# Patient Record
Sex: FEMALE | Race: OTHER | NOT HISPANIC OR LATINO
[De-identification: names, ages, dates, MRNs, and addresses within clinical notes are randomized per-mention and may not be internally consistent; named-entity substitution may affect disease eponyms.]

---

## 2022-07-20 PROBLEM — Z00.00 ENCOUNTER FOR PREVENTIVE HEALTH EXAMINATION: Status: ACTIVE | Noted: 2022-07-20

## 2022-07-27 ENCOUNTER — LABORATORY RESULT (OUTPATIENT)
Age: 32
End: 2022-07-27

## 2022-07-27 ENCOUNTER — TRANSCRIPTION ENCOUNTER (OUTPATIENT)
Age: 32
End: 2022-07-27

## 2022-07-27 ENCOUNTER — APPOINTMENT (OUTPATIENT)
Dept: OBGYN | Facility: CLINIC | Age: 32
End: 2022-07-27

## 2022-07-27 VITALS
DIASTOLIC BLOOD PRESSURE: 73 MMHG | BODY MASS INDEX: 20.16 KG/M2 | WEIGHT: 100 LBS | OXYGEN SATURATION: 100 % | SYSTOLIC BLOOD PRESSURE: 110 MMHG | HEIGHT: 59 IN | HEART RATE: 97 BPM

## 2022-07-27 PROCEDURE — 99213 OFFICE O/P EST LOW 20 MIN: CPT

## 2022-08-11 ENCOUNTER — LABORATORY RESULT (OUTPATIENT)
Age: 32
End: 2022-08-11

## 2022-08-12 ENCOUNTER — NON-APPOINTMENT (OUTPATIENT)
Age: 32
End: 2022-08-12

## 2022-08-17 ENCOUNTER — APPOINTMENT (OUTPATIENT)
Dept: OBGYN | Facility: CLINIC | Age: 32
End: 2022-08-17

## 2022-08-17 VITALS
SYSTOLIC BLOOD PRESSURE: 112 MMHG | OXYGEN SATURATION: 98 % | WEIGHT: 105 LBS | BODY MASS INDEX: 21.21 KG/M2 | HEART RATE: 98 BPM | DIASTOLIC BLOOD PRESSURE: 69 MMHG

## 2022-08-17 PROCEDURE — 99213 OFFICE O/P EST LOW 20 MIN: CPT | Mod: TH

## 2022-08-17 RX ORDER — DOCUSATE SODIUM 100 MG/1
100 CAPSULE ORAL TWICE DAILY
Qty: 60 | Refills: 2 | Status: ACTIVE | COMMUNITY
Start: 2022-08-17 | End: 1900-01-01

## 2022-09-08 ENCOUNTER — APPOINTMENT (OUTPATIENT)
Dept: ANTEPARTUM | Facility: CLINIC | Age: 32
End: 2022-09-08

## 2022-09-08 ENCOUNTER — ASOB RESULT (OUTPATIENT)
Age: 32
End: 2022-09-08

## 2022-09-08 DIAGNOSIS — Z63.8 OTHER SPECIFIED PROBLEMS RELATED TO PRIMARY SUPPORT GROUP: ICD-10-CM

## 2022-09-08 PROCEDURE — 76811 OB US DETAILED SNGL FETUS: CPT

## 2022-09-08 SDOH — SOCIAL STABILITY - SOCIAL INSECURITY: OTHER SPECIFIED PROBLEMS RELATED TO PRIMARY SUPPORT GROUP: Z63.8

## 2022-09-13 ENCOUNTER — NON-APPOINTMENT (OUTPATIENT)
Age: 32
End: 2022-09-13

## 2022-09-14 ENCOUNTER — APPOINTMENT (OUTPATIENT)
Dept: OBGYN | Facility: CLINIC | Age: 32
End: 2022-09-14

## 2022-09-14 VITALS — BODY MASS INDEX: 22.42 KG/M2 | WEIGHT: 111 LBS | SYSTOLIC BLOOD PRESSURE: 112 MMHG | DIASTOLIC BLOOD PRESSURE: 76 MMHG

## 2022-09-14 PROCEDURE — 99213 OFFICE O/P EST LOW 20 MIN: CPT | Mod: TH

## 2022-09-27 LAB
CLARI TEST COMMENT: NORMAL
CLARIM 22Q11.2: NORMAL
CLARIM ADDITIONAL INFO: NORMAL
CLARIM CHROMOSOME 13: NORMAL
CLARIM CHROMOSOME 18: NORMAL
CLARIM CHROMOSOME 21: NORMAL
CLARIM EDD: NORMAL
CLARIM SEX CHROMOSOMES: NORMAL
CLARITEST NIPT W/MICRO: NORMAL
FETAL FRACT: NORMAL
GESTATION AGE: NORMAL
MATERNAL WEIGHT (LBS):: 109
PLEASE INCLUDE GENDER RESULTS ON THIS REPORT:: NORMAL
TYPE OF PREGNANCY:: NORMAL

## 2022-10-04 ENCOUNTER — NON-APPOINTMENT (OUTPATIENT)
Age: 32
End: 2022-10-04

## 2022-10-05 ENCOUNTER — APPOINTMENT (OUTPATIENT)
Dept: OBGYN | Facility: CLINIC | Age: 32
End: 2022-10-05

## 2022-10-05 VITALS
DIASTOLIC BLOOD PRESSURE: 80 MMHG | HEART RATE: 121 BPM | BODY MASS INDEX: 23.03 KG/M2 | OXYGEN SATURATION: 99 % | WEIGHT: 114 LBS | SYSTOLIC BLOOD PRESSURE: 120 MMHG

## 2022-10-05 PROCEDURE — 99213 OFFICE O/P EST LOW 20 MIN: CPT | Mod: TH

## 2022-10-06 ENCOUNTER — APPOINTMENT (OUTPATIENT)
Dept: ANTEPARTUM | Facility: CLINIC | Age: 32
End: 2022-10-06

## 2022-10-06 ENCOUNTER — ASOB RESULT (OUTPATIENT)
Age: 32
End: 2022-10-06

## 2022-10-06 PROCEDURE — 76816 OB US FOLLOW-UP PER FETUS: CPT

## 2022-10-20 ENCOUNTER — LABORATORY RESULT (OUTPATIENT)
Age: 32
End: 2022-10-20

## 2022-10-25 ENCOUNTER — NON-APPOINTMENT (OUTPATIENT)
Age: 32
End: 2022-10-25

## 2022-10-27 ENCOUNTER — APPOINTMENT (OUTPATIENT)
Dept: OBGYN | Facility: CLINIC | Age: 32
End: 2022-10-27

## 2022-10-27 VITALS
BODY MASS INDEX: 24.64 KG/M2 | WEIGHT: 122 LBS | HEART RATE: 106 BPM | OXYGEN SATURATION: 98 % | SYSTOLIC BLOOD PRESSURE: 115 MMHG | DIASTOLIC BLOOD PRESSURE: 72 MMHG

## 2022-10-27 DIAGNOSIS — O99.810 ABNORMAL GLUCOSE COMPLICATING PREGNANCY: ICD-10-CM

## 2022-10-27 PROCEDURE — 99213 OFFICE O/P EST LOW 20 MIN: CPT | Mod: TH

## 2022-11-01 ENCOUNTER — NON-APPOINTMENT (OUTPATIENT)
Age: 32
End: 2022-11-01

## 2022-11-07 ENCOUNTER — APPOINTMENT (OUTPATIENT)
Dept: MATERNAL FETAL MEDICINE | Facility: CLINIC | Age: 32
End: 2022-11-07
Payer: SELF-PAY

## 2022-11-07 ENCOUNTER — ASOB RESULT (OUTPATIENT)
Age: 32
End: 2022-11-07

## 2022-11-07 DIAGNOSIS — O24.419 GESTATIONAL DIABETES MELLITUS IN PREGNANCY, UNSPECIFIED CONTROL: ICD-10-CM

## 2022-11-07 PROCEDURE — G0109 DIAB MANAGE TRN IND/GROUP: CPT | Mod: 95

## 2022-11-07 RX ORDER — URINE ACETONE TEST STRIPS
STRIP MISCELLANEOUS
Qty: 1 | Refills: 2 | Status: ACTIVE | COMMUNITY
Start: 2022-11-07 | End: 1900-01-01

## 2022-11-07 RX ORDER — BLOOD-GLUCOSE METER
W/DEVICE KIT MISCELLANEOUS
Qty: 1 | Refills: 0 | Status: ACTIVE | COMMUNITY
Start: 2022-11-07 | End: 1900-01-01

## 2022-11-15 ENCOUNTER — NON-APPOINTMENT (OUTPATIENT)
Age: 32
End: 2022-11-15

## 2022-11-16 ENCOUNTER — APPOINTMENT (OUTPATIENT)
Dept: MATERNAL FETAL MEDICINE | Facility: CLINIC | Age: 32
End: 2022-11-16

## 2022-11-17 ENCOUNTER — APPOINTMENT (OUTPATIENT)
Dept: OBGYN | Facility: CLINIC | Age: 32
End: 2022-11-17

## 2022-11-17 VITALS
OXYGEN SATURATION: 99 % | SYSTOLIC BLOOD PRESSURE: 120 MMHG | WEIGHT: 122 LBS | HEART RATE: 120 BPM | BODY MASS INDEX: 24.64 KG/M2 | DIASTOLIC BLOOD PRESSURE: 77 MMHG

## 2022-11-17 PROCEDURE — 99213 OFFICE O/P EST LOW 20 MIN: CPT | Mod: TH

## 2022-11-28 ENCOUNTER — ASOB RESULT (OUTPATIENT)
Age: 32
End: 2022-11-28

## 2022-11-28 ENCOUNTER — APPOINTMENT (OUTPATIENT)
Dept: MATERNAL FETAL MEDICINE | Facility: CLINIC | Age: 32
End: 2022-11-28

## 2022-11-28 PROCEDURE — G0108 DIAB MANAGE TRN  PER INDIV: CPT | Mod: 95

## 2022-11-28 RX ORDER — LANCETS
EACH MISCELLANEOUS
Qty: 1 | Refills: 3 | Status: ACTIVE | COMMUNITY
Start: 2022-11-07 | End: 1900-01-01

## 2022-11-28 RX ORDER — BLOOD SUGAR DIAGNOSTIC
STRIP MISCELLANEOUS 4 TIMES DAILY
Qty: 1 | Refills: 3 | Status: ACTIVE | COMMUNITY
Start: 2022-11-07 | End: 1900-01-01

## 2022-12-01 ENCOUNTER — LABORATORY RESULT (OUTPATIENT)
Age: 32
End: 2022-12-01

## 2022-12-06 ENCOUNTER — NON-APPOINTMENT (OUTPATIENT)
Age: 32
End: 2022-12-06

## 2022-12-08 ENCOUNTER — APPOINTMENT (OUTPATIENT)
Dept: OBGYN | Facility: CLINIC | Age: 32
End: 2022-12-08

## 2022-12-08 VITALS
OXYGEN SATURATION: 98 % | HEART RATE: 90 BPM | BODY MASS INDEX: 24.44 KG/M2 | SYSTOLIC BLOOD PRESSURE: 104 MMHG | WEIGHT: 121 LBS | DIASTOLIC BLOOD PRESSURE: 73 MMHG

## 2022-12-08 PROCEDURE — 99213 OFFICE O/P EST LOW 20 MIN: CPT | Mod: TH

## 2022-12-13 ENCOUNTER — APPOINTMENT (OUTPATIENT)
Dept: MATERNAL FETAL MEDICINE | Facility: CLINIC | Age: 32
End: 2022-12-13

## 2022-12-13 ENCOUNTER — ASOB RESULT (OUTPATIENT)
Age: 32
End: 2022-12-13

## 2022-12-13 PROCEDURE — G0108 DIAB MANAGE TRN  PER INDIV: CPT | Mod: 95

## 2022-12-13 RX ORDER — PEN NEEDLE, DIABETIC 32GX 5/32"
32G X 4 MM NEEDLE, DISPOSABLE MISCELLANEOUS
Qty: 1 | Refills: 0 | Status: ACTIVE | COMMUNITY
Start: 2022-12-13 | End: 1900-01-01

## 2022-12-13 RX ORDER — INSULIN LISPRO 100 U/ML
100 INJECTION, SOLUTION SUBCUTANEOUS
Qty: 1 | Refills: 2 | Status: ACTIVE | COMMUNITY
Start: 2022-12-13 | End: 1900-01-01

## 2022-12-13 RX ORDER — ISOPROPYL ALCOHOL 0.7 ML/ML
SWAB TOPICAL
Qty: 1 | Refills: 1 | Status: ACTIVE | COMMUNITY
Start: 2022-12-13 | End: 1900-01-01

## 2022-12-15 ENCOUNTER — APPOINTMENT (OUTPATIENT)
Dept: ANTEPARTUM | Facility: CLINIC | Age: 32
End: 2022-12-15

## 2022-12-15 ENCOUNTER — ASOB RESULT (OUTPATIENT)
Age: 32
End: 2022-12-15

## 2022-12-15 PROCEDURE — 76816 OB US FOLLOW-UP PER FETUS: CPT

## 2022-12-16 ENCOUNTER — NON-APPOINTMENT (OUTPATIENT)
Age: 32
End: 2022-12-16

## 2022-12-20 ENCOUNTER — NON-APPOINTMENT (OUTPATIENT)
Age: 32
End: 2022-12-20

## 2022-12-22 ENCOUNTER — APPOINTMENT (OUTPATIENT)
Dept: OBGYN | Facility: CLINIC | Age: 32
End: 2022-12-22

## 2022-12-22 VITALS
BODY MASS INDEX: 24.84 KG/M2 | DIASTOLIC BLOOD PRESSURE: 72 MMHG | OXYGEN SATURATION: 99 % | SYSTOLIC BLOOD PRESSURE: 105 MMHG | HEART RATE: 93 BPM | WEIGHT: 123 LBS

## 2022-12-22 PROCEDURE — 99213 OFFICE O/P EST LOW 20 MIN: CPT | Mod: TH

## 2022-12-28 ENCOUNTER — APPOINTMENT (OUTPATIENT)
Dept: MATERNAL FETAL MEDICINE | Facility: CLINIC | Age: 32
End: 2022-12-28
Payer: MEDICAID

## 2022-12-28 ENCOUNTER — LABORATORY RESULT (OUTPATIENT)
Age: 32
End: 2022-12-28

## 2022-12-28 ENCOUNTER — ASOB RESULT (OUTPATIENT)
Age: 32
End: 2022-12-28

## 2022-12-28 ENCOUNTER — APPOINTMENT (OUTPATIENT)
Dept: OBGYN | Facility: CLINIC | Age: 32
End: 2022-12-28

## 2022-12-28 VITALS
WEIGHT: 123 LBS | OXYGEN SATURATION: 98 % | HEART RATE: 98 BPM | DIASTOLIC BLOOD PRESSURE: 77 MMHG | SYSTOLIC BLOOD PRESSURE: 123 MMHG | BODY MASS INDEX: 24.84 KG/M2

## 2022-12-28 PROCEDURE — G0108 DIAB MANAGE TRN  PER INDIV: CPT | Mod: 95

## 2022-12-28 PROCEDURE — 99213 OFFICE O/P EST LOW 20 MIN: CPT | Mod: TH

## 2022-12-29 ENCOUNTER — ASOB RESULT (OUTPATIENT)
Age: 32
End: 2022-12-29

## 2022-12-29 ENCOUNTER — APPOINTMENT (OUTPATIENT)
Dept: ANTEPARTUM | Facility: CLINIC | Age: 32
End: 2022-12-29

## 2022-12-29 PROCEDURE — 76818 FETAL BIOPHYS PROFILE W/NST: CPT

## 2023-01-04 ENCOUNTER — NON-APPOINTMENT (OUTPATIENT)
Age: 33
End: 2023-01-04

## 2023-01-05 ENCOUNTER — APPOINTMENT (OUTPATIENT)
Dept: ANTEPARTUM | Facility: CLINIC | Age: 33
End: 2023-01-05
Payer: MEDICAID

## 2023-01-05 ENCOUNTER — ASOB RESULT (OUTPATIENT)
Age: 33
End: 2023-01-05

## 2023-01-05 ENCOUNTER — APPOINTMENT (OUTPATIENT)
Dept: OBGYN | Facility: CLINIC | Age: 33
End: 2023-01-05
Payer: MEDICAID

## 2023-01-05 VITALS
HEART RATE: 89 BPM | SYSTOLIC BLOOD PRESSURE: 115 MMHG | DIASTOLIC BLOOD PRESSURE: 78 MMHG | OXYGEN SATURATION: 99 % | WEIGHT: 123 LBS | BODY MASS INDEX: 24.84 KG/M2

## 2023-01-05 PROCEDURE — 76818 FETAL BIOPHYS PROFILE W/NST: CPT

## 2023-01-05 PROCEDURE — 99212 OFFICE O/P EST SF 10 MIN: CPT | Mod: TH

## 2023-01-09 ENCOUNTER — NON-APPOINTMENT (OUTPATIENT)
Age: 33
End: 2023-01-09

## 2023-01-11 ENCOUNTER — APPOINTMENT (OUTPATIENT)
Dept: OBGYN | Facility: CLINIC | Age: 33
End: 2023-01-11
Payer: MEDICAID

## 2023-01-11 ENCOUNTER — APPOINTMENT (OUTPATIENT)
Dept: MATERNAL FETAL MEDICINE | Facility: CLINIC | Age: 33
End: 2023-01-11
Payer: MEDICAID

## 2023-01-11 ENCOUNTER — ASOB RESULT (OUTPATIENT)
Age: 33
End: 2023-01-11

## 2023-01-11 VITALS
DIASTOLIC BLOOD PRESSURE: 78 MMHG | SYSTOLIC BLOOD PRESSURE: 114 MMHG | HEART RATE: 78 BPM | WEIGHT: 124 LBS | BODY MASS INDEX: 25.04 KG/M2 | OXYGEN SATURATION: 98 %

## 2023-01-11 PROCEDURE — G0108 DIAB MANAGE TRN  PER INDIV: CPT | Mod: 95

## 2023-01-11 PROCEDURE — 99212 OFFICE O/P EST SF 10 MIN: CPT | Mod: TH

## 2023-01-12 ENCOUNTER — APPOINTMENT (OUTPATIENT)
Dept: ANTEPARTUM | Facility: CLINIC | Age: 33
End: 2023-01-12
Payer: MEDICAID

## 2023-01-12 ENCOUNTER — ASOB RESULT (OUTPATIENT)
Age: 33
End: 2023-01-12

## 2023-01-12 PROCEDURE — 76818 FETAL BIOPHYS PROFILE W/NST: CPT | Mod: 59

## 2023-01-12 PROCEDURE — 76816 OB US FOLLOW-UP PER FETUS: CPT

## 2023-01-17 ENCOUNTER — NON-APPOINTMENT (OUTPATIENT)
Age: 33
End: 2023-01-17

## 2023-01-18 ENCOUNTER — APPOINTMENT (OUTPATIENT)
Dept: ANTEPARTUM | Facility: CLINIC | Age: 33
End: 2023-01-18
Payer: MEDICAID

## 2023-01-18 ENCOUNTER — ASOB RESULT (OUTPATIENT)
Age: 33
End: 2023-01-18

## 2023-01-18 PROCEDURE — 76819 FETAL BIOPHYS PROFIL W/O NST: CPT

## 2023-01-19 ENCOUNTER — INPATIENT (INPATIENT)
Facility: HOSPITAL | Age: 33
LOS: 1 days | Discharge: HOME | End: 2023-01-21
Attending: OBSTETRICS & GYNECOLOGY | Admitting: OBSTETRICS & GYNECOLOGY
Payer: MEDICAID

## 2023-01-19 ENCOUNTER — NON-APPOINTMENT (OUTPATIENT)
Age: 33
End: 2023-01-19

## 2023-01-19 VITALS — WEIGHT: 125 LBS | HEIGHT: 59 IN

## 2023-01-19 DIAGNOSIS — Z34.80 ENCOUNTER FOR SUPERVISION OF OTHER NORMAL PREGNANCY, UNSPECIFIED TRIMESTER: ICD-10-CM

## 2023-01-19 DIAGNOSIS — O26.899 OTHER SPECIFIED PREGNANCY RELATED CONDITIONS, UNSPECIFIED TRIMESTER: ICD-10-CM

## 2023-01-19 DIAGNOSIS — Z3A.00 WEEKS OF GESTATION OF PREGNANCY NOT SPECIFIED: ICD-10-CM

## 2023-01-19 LAB
APTT BLD: 27.4 SEC — LOW (ref 27.5–35.5)
BASOPHILS # BLD AUTO: 0.03 K/UL — SIGNIFICANT CHANGE UP (ref 0–0.2)
BASOPHILS NFR BLD AUTO: 0.3 % — SIGNIFICANT CHANGE UP (ref 0–2)
BLD GP AB SCN SERPL QL: SIGNIFICANT CHANGE UP
EOSINOPHIL # BLD AUTO: 0.08 K/UL — SIGNIFICANT CHANGE UP (ref 0–0.5)
EOSINOPHIL NFR BLD AUTO: 0.8 % — SIGNIFICANT CHANGE UP (ref 0–6)
FIBRINOGEN PPP-MCNC: 586 MG/DL — HIGH (ref 340–550)
HCT VFR BLD CALC: 37.2 % — SIGNIFICANT CHANGE UP (ref 34.5–45)
HGB BLD-MCNC: 13.1 G/DL — SIGNIFICANT CHANGE UP (ref 11.5–15.5)
IMM GRANULOCYTES NFR BLD AUTO: 1.2 % — HIGH (ref 0–0.9)
INR BLD: 0.93 RATIO — SIGNIFICANT CHANGE UP (ref 0.88–1.16)
LYMPHOCYTES # BLD AUTO: 1.11 K/UL — SIGNIFICANT CHANGE UP (ref 1–3.3)
LYMPHOCYTES # BLD AUTO: 10.6 % — LOW (ref 13–44)
MCHC RBC-ENTMCNC: 33.2 PG — SIGNIFICANT CHANGE UP (ref 27–34)
MCHC RBC-ENTMCNC: 35.2 GM/DL — SIGNIFICANT CHANGE UP (ref 32–36)
MCV RBC AUTO: 94.4 FL — SIGNIFICANT CHANGE UP (ref 80–100)
MONOCYTES # BLD AUTO: 1 K/UL — HIGH (ref 0–0.9)
MONOCYTES NFR BLD AUTO: 9.5 % — SIGNIFICANT CHANGE UP (ref 2–14)
NEUTROPHILS # BLD AUTO: 8.13 K/UL — HIGH (ref 1.8–7.4)
NEUTROPHILS NFR BLD AUTO: 77.6 % — HIGH (ref 43–77)
NRBC # BLD: 0 /100 WBCS — SIGNIFICANT CHANGE UP (ref 0–0)
PLATELET # BLD AUTO: 206 K/UL — SIGNIFICANT CHANGE UP (ref 150–400)
PROTHROM AB SERPL-ACNC: 11.1 SEC — SIGNIFICANT CHANGE UP (ref 10.5–13.4)
RBC # BLD: 3.94 M/UL — SIGNIFICANT CHANGE UP (ref 3.8–5.2)
RBC # FLD: 12.4 % — SIGNIFICANT CHANGE UP (ref 10.3–14.5)
SARS-COV-2 RNA SPEC QL NAA+PROBE: SIGNIFICANT CHANGE UP
T PALLIDUM AB TITR SER: NEGATIVE — SIGNIFICANT CHANGE UP
WBC # BLD: 10.48 K/UL — SIGNIFICANT CHANGE UP (ref 3.8–10.5)
WBC # FLD AUTO: 10.48 K/UL — SIGNIFICANT CHANGE UP (ref 3.8–10.5)

## 2023-01-19 RX ORDER — CHLORHEXIDINE GLUCONATE 213 G/1000ML
1 SOLUTION TOPICAL ONCE
Refills: 0 | Status: DISCONTINUED | OUTPATIENT
Start: 2023-01-19 | End: 2023-01-20

## 2023-01-19 RX ORDER — CITRIC ACID/SODIUM CITRATE 300-500 MG
15 SOLUTION, ORAL ORAL EVERY 6 HOURS
Refills: 0 | Status: DISCONTINUED | OUTPATIENT
Start: 2023-01-19 | End: 2023-01-20

## 2023-01-19 RX ORDER — OXYTOCIN 10 UNIT/ML
333.33 VIAL (ML) INJECTION
Qty: 20 | Refills: 0 | Status: COMPLETED | OUTPATIENT
Start: 2023-01-19 | End: 2023-01-19

## 2023-01-19 RX ORDER — SODIUM CHLORIDE 9 MG/ML
1000 INJECTION INTRAMUSCULAR; INTRAVENOUS; SUBCUTANEOUS
Refills: 0 | Status: DISCONTINUED | OUTPATIENT
Start: 2023-01-19 | End: 2023-01-20

## 2023-01-19 RX ORDER — SODIUM CHLORIDE 9 MG/ML
1000 INJECTION, SOLUTION INTRAVENOUS
Refills: 0 | Status: DISCONTINUED | OUTPATIENT
Start: 2023-01-19 | End: 2023-01-20

## 2023-01-19 RX ADMIN — SODIUM CHLORIDE 125 MILLILITER(S): 9 INJECTION, SOLUTION INTRAVENOUS at 23:50

## 2023-01-19 RX ADMIN — SODIUM CHLORIDE 125 MILLILITER(S): 9 INJECTION INTRAMUSCULAR; INTRAVENOUS; SUBCUTANEOUS at 11:00

## 2023-01-19 NOTE — PATIENT PROFILE OB - AMNIOTIC FLUID COLOR, LABOR
[facility] - Respiratory ICU  Cardiothoracic Surgery  Progress Note    Patient Name: Ranjana Sanchez  MRN: [id number]  Admission Date: 4/10/2020  Hospital Length of Stay: 51 days  Code Status: Full Code   Attending Physician: Francis Ayon MD   Referring Provider: Francis Ayon MD  Principal Problem:Acute respiratory disease due to COVID-19 virus      Subjective:     Post-Op Info:  Procedure(s) (LRB):  CREATION, TRACHEOSTOMY  (N/A)   26 Days Post-Op     Interval History: sweep weaned to 5; same neuro exam; higher TV at 250-300 at Pi of 24 and peep of 5    ROS  Medications:  Continuous Infusions:   dexmedetomidine (PRECEDEX) infusion 0.1 mcg/kg/hr (05/31/20 1500)    furosemide (LASIX) 2 mg/mL continuous infusion (non-titrating) 5 mg/hr (05/31/20 1500)    heparin (porcine) in 5 % dex 700 Units/hr (05/31/20 1500)    nicardipine Stopped (05/29/20 1027)    vasopressin (PITRESSIN) infusion Stopped (05/29/20 1740)     Scheduled Meds:   aspirin  81 mg Per NG tube Daily    chlorhexidine  15 mL Mouth/Throat BID    fentaNYL  1 patch Transdermal Q72H    labetalol  200 mg Per G Tube BID    multivitamin liquid no.118  10 mL Per G Tube Daily    pantoprazole  40 mg Intravenous Q12H    piperacillin-tazobactam (ZOSYN) IVPB  4.5 g Intravenous Q8H    polyethylene glycol  17 g Per NG tube Daily    potassium chloride 10%  20 mEq Per NG tube BID    psyllium husk (aspartame)  1 packet Per NG tube TID    QUEtiapine  50 mg Per NG tube QHS    sucralfate  1 g Per NG tube Q6H    vitamin D  1,000 Units Per G Tube Daily     PRN Meds:sodium chloride, sodium chloride, acetaminophen, artificial tears, calcium gluconate IVPB, calcium gluconate IVPB, calcium gluconate IVPB, Dextrose 10% Bolus, fentaNYL, glucose, glucose, insulin aspart U-100, magnesium oxide, magnesium oxide, magnesium sulfate IVPB, potassium chloride 10%, potassium chloride 10%, potassium chloride 10%, potassium, sodium phosphates, sodium chloride  0.9%     Objective:     Vital Signs (Most Recent):  Temp: 98.3 °F (36.8 °C) (05/31/20 1115)  Pulse: 100 (05/31/20 1500)  Resp: (!) 30 (05/31/20 1306)  BP: 107/64 (05/29/20 1713)  SpO2: 100 % (05/31/20 1500) Vital Signs (24h Range):  Temp:  [98.1 °F (36.7 °C)-98.5 °F (36.9 °C)] 98.3 °F (36.8 °C)  Pulse:  [] 100  Resp:  [30-35] 30  SpO2:  [97 %-100 %] 100 %  Arterial Line BP: ()/(51-74) 108/66     Weight: 58.9 kg (129 lb 13.6 oz)  Body mass index is 22.29 kg/m².    SpO2: 100 %  O2 Device (Oxygen Therapy): ventilator    Intake/Output - Last 3 Shifts       05/29 0700 - 05/30 0659 05/30 0700 - 05/31 0659 05/31 0700 - 06/01 0659    I.V. (mL/kg) 295.2 (5.2) 1059.4 (18) 473.9 (8)    Blood 350 350     NG/GT 1945 2140 765    IV Piggyback 300 100     Total Intake(mL/kg) 2890.2 (50.4) 3649.4 (62) 1238.9 (21)    Urine (mL/kg/hr) 2945 (2.1) 3925 (2.8) 1865 (3.7)    Other       Stool       Blood 4 7 3    Total Output 2949 3932 1868    Net -58.8 -282.6 -629.1                 Lines/Drains/Airways     Peripherally Inserted Central Catheter Line            PICC Double Lumen 05/05/20 1707 right basilic 25 days          Central Venous Catheter Line                 ECMO Cannula 04/25/20 1120 right femoral vein 36 days         ECMO Cannula 04/25/20 1120 right internal jugular 36 days          Drain                 NG/OG Tube 05/03/20 1215 [place] sump;nasogastric 18 Fr. Left nostril 28 days         Urethral Catheter 05/28/20 1630 2 days          Airway                 Surgical Airway 05/05/20 1500 [place]iley Cuffed 26 days          Arterial Line                 Arterial Line 04/27/20 1100 Right Brachial 34 days          Peripheral Intravenous Line                 Peripheral IV - Single Lumen 05/26/20 0845 18 G Left Upper Arm 5 days         Peripheral IV - Single Lumen 05/30/20 1205 20 G Left Other 1 day         Peripheral IV - Single Lumen 05/31/20 0220 20 G Right Wrist less than 1 day                Physical Exam  Same neuro exam;  tachypnea;     Significant Labs:  Cardiac markers: No results for input(s): CKMB, CPKMB, TROPONINT, TROPONINI, MYOGLOBIN in the last 48 hours.  CBC:   Recent Labs   Lab 05/31/20  1359   WBC 12.75*   RBC 3.53*   HGB 10.7*   HCT 33.6*   *   MCV 95   MCH 30.3   MCHC 31.8*     CMP:   Recent Labs   Lab 05/31/20  1359   *   CALCIUM 10.3   ALBUMIN 3.9   PROT 7.2   *   K 3.4*   CO2 29      BUN 69*   CREATININE 1.2   ALKPHOS 181*   ALT 53*   AST 32   BILITOT 1.0     Coagulation:   Recent Labs   Lab 05/31/20  1359   INR 1.1   APTT 31.4     Lactic Acid: No results for input(s): LACTATE in the last 48 hours.    Significant Diagnostics:  CXR: same, similar opacities    Assessment/Plan:     * Acute respiratory disease due to COVID-19 virus  Acute respiratory distress syndrome (ARDS) due to COVID-19 virus  - Crich switched to ETT on 4/11  - Monika weaned off 5/5  - Tracheostomy and bronch 5/5  - severe thrombocytopenia resolved; transfuse for under 30K  - Aspirin 81 daily  - decrease to labetalol 200 bid  - RPM at 2600; flows at 2.8  - Sweep at 5; CO2 goal is 59 or below; do not increase sweep for pCO2s 59 or below as long as patient is not acidotic, as these high pCO2s are respiratory compensation for alkalosis  - Oxygenator Fi at 50%; wean further as tolerated  - increase free water to 300 q4h for hypernatremia     Tongue laceration      -ENT evaluated, laceration superficial      -recs: bite block vs paralysis, will monitor    Sedation  - Valium, seroquel, PRN Fentanyl     UGI Bleed  - Scoped by GI 5/3 with epinephrine injection and clip placement for an actively bleeding D2/D3 Diuelafoy lesion    FEN/GI  - TF at 45, goal      - Lasix gtt 5  - Resuming FWB for hypernatremia 300mL q6     Anticoagulation      - New goal aXa 0.25-0.3 Q6hrs      - hep gtt      - monitor LDH    Prophylaxis      - protonix q12 IV       - leukocytosis stable      - PICC placed 5/5; central line removed 5/6    Rafael Nj,  MD  Cardiothoracic Surgery  [facility] - Respiratory ICU      VV ECMO circuit checked and all parameters reviewed. Anticoagulation was managed and all cannulation exit sites along with review of labs and radiology studies performed. Speed and functioning of the pump along with oxygenator quality reviewed.  Patient continues to be stable on  tube feeds at 45 cc an hour.  No concerns from the nursing staff or perfusion team with any flow related issues.  Dear started free water boluses for his hypernatremia.  Significant amount of time was spent in coordinating care  between different teams which consisted of the surgical ICU to, perfusion Staff and the nursing staff.  All questions and concerns all team members were addressed.    bloody

## 2023-01-19 NOTE — PATIENT PROFILE OB - FALL HARM RISK - UNIVERSAL INTERVENTIONS
Bed in lowest position, wheels locked, appropriate side rails in place/Call bell, personal items and telephone in reach/Instruct patient to call for assistance before getting out of bed or chair/Non-slip footwear when patient is out of bed/[place] to call system/Physically safe environment - no spills, clutter or unnecessary equipment/Purposeful Proactive Rounding/Room/bathroom lighting operational, light cord in reach

## 2023-01-19 NOTE — PATIENT PROFILE OB - BABY A: DATE/TIME OF DELIVERY
Patient armband removed and shredded    MyChart Activation    Thank you for requesting access to [employer]. Please follow the instructions below to securely access and download your online medical record. [employer] allows you to send messages to your doctor, view your test results, renew your prescriptions, schedule appointments, and more. How Do I Sign Up? 1. In your internet browser, go to [email or web address]  2. Click on the First Time User? Click Here link in the Sign In box. You will be redirect to the New Member Sign Up page. 3. Enter your [employer] Access Code exactly as it appears below. You will not need to use this code after youve completed the sign-up process. If you do not sign up before the expiration date, you must request a new code. [employer] Access Code: [email or web address]  Expires: 10/17/2020  3:58 PM (This is the date your [employer] access code will )    4. Enter the last four digits of your Social Security Number (xxxx) and Date of Birth (mm/dd/yyyy) as indicated and click Submit. You will be taken to the next sign-up page. 5. Create a [employer] ID. This will be your [employer] login ID and cannot be changed, so think of one that is secure and easy to remember. 6. Create a [employer] password. You can change your password at any time. 7. Enter your Password Reset Question and Answer. This can be used at a later time if you forget your password. 8. Enter your e-mail address. You will receive e-mail notification when new information is available in [address] 9. Click Sign Up. You can now view and download portions of your medical record. 10. Click the Download Summary menu link to download a portable copy of your medical information. Additional Information    If you have questions, please visit the Frequently Asked Questions section of the [employer] website at [email or web address] Remember, [employer] is NOT to be used for urgent needs.  For medical emergencies, dial [address] Bosch from Nurse    PATIENT INSTRUCTIONS:    After general anesthesia or intravenous sedation, for 24 hours or while taking prescription Narcotics:  · Limit your activities  · Do not drive and operate hazardous machinery  · Do not make important personal or business decisions  · Do  not drink alcoholic beverages  · If you have not urinated within 8 hours after discharge, please contact your surgeon on call. Report the following to your surgeon:  · Excessive pain, swelling, redness or odor of or around the surgical area  · Temperature over 100.5  · Nausea and vomiting lasting longer than 4 hours or if unable to take medications  · Any signs of decreased circulation or nerve impairment to extremity: change in color, persistent  numbness, tingling, coldness or increase pain  · Any questions    What to do at Home:  Recommended activity: Activity as tolerated    If you experience any of the following symptoms Nausea,vomiting, diarrhea, chest pain, shortness of breath  , please follow up with PCP. *  Please give a list of your current medications to your Primary Care Provider. *  Please update this list whenever your medications are discontinued, doses are      changed, or new medications (including over-the-counter products) are added. *  Please carry medication information at all times in case of emergency situations. These are general instructions for a healthy lifestyle:    No smoking/ No tobacco products/ Avoid exposure to second hand smoke  Surgeon General's Warning:  Quitting smoking now greatly reduces serious risk to your health.     Obesity, smoking, and sedentary lifestyle greatly increases your risk for illness    A healthy diet, regular physical exercise & weight monitoring are important for maintaining a healthy lifestyle    You may be retaining fluid if you have a history of heart failure or if you experience any of the following symptoms:  Weight gain of 3 pounds or more overnight or 5 pounds in a week, increased swelling in our hands or feet or shortness of breath while lying flat in bed. Please call your doctor as soon as you notice any of these symptoms; do not wait until your next office visit. The discharge information has been reviewed with the patient. The patient verbalized understanding. Discharge medications reviewed with the patient and appropriate educational materials and side effects teaching were provided. ___________________________________________________________________________________________________________________________________    ------------------------------------------------------------------------------------------------------------    DISCHARGE INSTRUCTIONS    1. Make sure that when you request refills at the pharmacy that the refill requests are sent to your PCP (NOT to the prescriber at the [facility]) to avoid any delays in getting your medication refills. The physician at the [facility] will not able to order medications or refills after discharge -- Please understand that though we would like to help, it is simply not safe for our physician to order you a medication that we cannot monitor. 2. If any of the prescribed medications require a PRIOR AUTHORIZATION, contact your Primary Care Physician or specialist to EITHER complete prior authorizations and paperwork on your behalf OR prescribe an alternative medication. -- Please understand that though we would like to help, it is simply not safe for our physician to order you a medication that we cannot monitor.      3. If any of your prescriptions medications PRIOR TO ADMISSION TO [place] [id number] needs a refill (and either the dosage, frequency or instructions was not changed), kindly contact your Primary Care Physician for refills.     ------------------------------------------------------------------------------------------------------------------- 20-Jan-2023 01:53

## 2023-01-20 ENCOUNTER — TRANSCRIPTION ENCOUNTER (OUTPATIENT)
Age: 33
End: 2023-01-20

## 2023-01-20 DIAGNOSIS — O24.419 GESTATIONAL DIABETES MELLITUS IN PREGNANCY, UNSPECIFIED CONTROL: ICD-10-CM

## 2023-01-20 LAB
BASOPHILS # BLD AUTO: 0.05 K/UL — SIGNIFICANT CHANGE UP (ref 0–0.2)
BASOPHILS NFR BLD AUTO: 0.3 % — SIGNIFICANT CHANGE UP (ref 0–2)
EOSINOPHIL # BLD AUTO: 0.04 K/UL — SIGNIFICANT CHANGE UP (ref 0–0.5)
EOSINOPHIL NFR BLD AUTO: 0.2 % — SIGNIFICANT CHANGE UP (ref 0–6)
HCT VFR BLD CALC: 34.4 % — LOW (ref 34.5–45)
HGB BLD-MCNC: 11.8 G/DL — SIGNIFICANT CHANGE UP (ref 11.5–15.5)
IMM GRANULOCYTES NFR BLD AUTO: 1 % — HIGH (ref 0–0.9)
LYMPHOCYTES # BLD AUTO: 0.95 K/UL — LOW (ref 1–3.3)
LYMPHOCYTES # BLD AUTO: 5.5 % — LOW (ref 13–44)
MCHC RBC-ENTMCNC: 33.1 PG — SIGNIFICANT CHANGE UP (ref 27–34)
MCHC RBC-ENTMCNC: 34.3 GM/DL — SIGNIFICANT CHANGE UP (ref 32–36)
MCV RBC AUTO: 96.6 FL — SIGNIFICANT CHANGE UP (ref 80–100)
MONOCYTES # BLD AUTO: 1.16 K/UL — HIGH (ref 0–0.9)
MONOCYTES NFR BLD AUTO: 6.7 % — SIGNIFICANT CHANGE UP (ref 2–14)
NEUTROPHILS # BLD AUTO: 14.96 K/UL — HIGH (ref 1.8–7.4)
NEUTROPHILS NFR BLD AUTO: 86.3 % — HIGH (ref 43–77)
NRBC # BLD: 0 /100 WBCS — SIGNIFICANT CHANGE UP (ref 0–0)
PLATELET # BLD AUTO: 214 K/UL — SIGNIFICANT CHANGE UP (ref 150–400)
RBC # BLD: 3.56 M/UL — LOW (ref 3.8–5.2)
RBC # FLD: 12.3 % — SIGNIFICANT CHANGE UP (ref 10.3–14.5)
WBC # BLD: 17.33 K/UL — HIGH (ref 3.8–10.5)
WBC # FLD AUTO: 17.33 K/UL — HIGH (ref 3.8–10.5)

## 2023-01-20 PROCEDURE — 59409 OBSTETRICAL CARE: CPT | Mod: U9

## 2023-01-20 RX ORDER — PRAMOXINE HYDROCHLORIDE 150 MG/15G
1 AEROSOL, FOAM RECTAL EVERY 4 HOURS
Refills: 0 | Status: DISCONTINUED | OUTPATIENT
Start: 2023-01-20 | End: 2023-01-21

## 2023-01-20 RX ORDER — MAGNESIUM HYDROXIDE 400 MG/1
30 TABLET, CHEWABLE ORAL
Refills: 0 | Status: DISCONTINUED | OUTPATIENT
Start: 2023-01-20 | End: 2023-01-21

## 2023-01-20 RX ORDER — GLUCAGON INJECTION, SOLUTION 0.5 MG/.1ML
1 INJECTION, SOLUTION SUBCUTANEOUS ONCE
Refills: 0 | Status: DISCONTINUED | OUTPATIENT
Start: 2023-01-20 | End: 2023-01-21

## 2023-01-20 RX ORDER — OXYCODONE HYDROCHLORIDE 5 MG/1
5 TABLET ORAL
Refills: 0 | Status: DISCONTINUED | OUTPATIENT
Start: 2023-01-20 | End: 2023-01-21

## 2023-01-20 RX ORDER — OXYTOCIN 10 UNIT/ML
41.67 VIAL (ML) INJECTION
Qty: 20 | Refills: 0 | Status: DISCONTINUED | OUTPATIENT
Start: 2023-01-20 | End: 2023-01-21

## 2023-01-20 RX ORDER — LANOLIN
1 OINTMENT (GRAM) TOPICAL EVERY 6 HOURS
Refills: 0 | Status: DISCONTINUED | OUTPATIENT
Start: 2023-01-20 | End: 2023-01-21

## 2023-01-20 RX ORDER — ACETAMINOPHEN 500 MG
975 TABLET ORAL EVERY 6 HOURS
Refills: 0 | Status: DISCONTINUED | OUTPATIENT
Start: 2023-01-20 | End: 2023-01-21

## 2023-01-20 RX ORDER — DIBUCAINE 1 %
1 OINTMENT (GRAM) RECTAL EVERY 6 HOURS
Refills: 0 | Status: DISCONTINUED | OUTPATIENT
Start: 2023-01-20 | End: 2023-01-21

## 2023-01-20 RX ORDER — DEXTROSE 50 % IN WATER 50 %
25 SYRINGE (ML) INTRAVENOUS ONCE
Refills: 0 | Status: DISCONTINUED | OUTPATIENT
Start: 2023-01-20 | End: 2023-01-21

## 2023-01-20 RX ORDER — DIPHENHYDRAMINE HCL 50 MG
25 CAPSULE ORAL EVERY 6 HOURS
Refills: 0 | Status: DISCONTINUED | OUTPATIENT
Start: 2023-01-20 | End: 2023-01-21

## 2023-01-20 RX ORDER — SODIUM CHLORIDE 9 MG/ML
1000 INJECTION, SOLUTION INTRAVENOUS
Refills: 0 | Status: DISCONTINUED | OUTPATIENT
Start: 2023-01-20 | End: 2023-01-21

## 2023-01-20 RX ORDER — INSULIN LISPRO 100/ML
VIAL (ML) SUBCUTANEOUS
Refills: 0 | Status: DISCONTINUED | OUTPATIENT
Start: 2023-01-20 | End: 2023-01-21

## 2023-01-20 RX ORDER — TETANUS TOXOID, REDUCED DIPHTHERIA TOXOID AND ACELLULAR PERTUSSIS VACCINE, ADSORBED 5; 2.5; 8; 8; 2.5 [IU]/.5ML; [IU]/.5ML; UG/.5ML; UG/.5ML; UG/.5ML
0.5 SUSPENSION INTRAMUSCULAR ONCE
Refills: 0 | Status: DISCONTINUED | OUTPATIENT
Start: 2023-01-20 | End: 2023-01-21

## 2023-01-20 RX ORDER — IBUPROFEN 200 MG
600 TABLET ORAL EVERY 6 HOURS
Refills: 0 | Status: DISCONTINUED | OUTPATIENT
Start: 2023-01-20 | End: 2023-01-21

## 2023-01-20 RX ORDER — SIMETHICONE 80 MG/1
80 TABLET, CHEWABLE ORAL EVERY 4 HOURS
Refills: 0 | Status: DISCONTINUED | OUTPATIENT
Start: 2023-01-20 | End: 2023-01-21

## 2023-01-20 RX ORDER — DEXTROSE 50 % IN WATER 50 %
15 SYRINGE (ML) INTRAVENOUS ONCE
Refills: 0 | Status: DISCONTINUED | OUTPATIENT
Start: 2023-01-20 | End: 2023-01-21

## 2023-01-20 RX ORDER — HYDROCORTISONE 1 %
1 OINTMENT (GRAM) TOPICAL EVERY 6 HOURS
Refills: 0 | Status: DISCONTINUED | OUTPATIENT
Start: 2023-01-20 | End: 2023-01-21

## 2023-01-20 RX ORDER — BENZOCAINE 10 %
1 GEL (GRAM) MUCOUS MEMBRANE EVERY 6 HOURS
Refills: 0 | Status: DISCONTINUED | OUTPATIENT
Start: 2023-01-20 | End: 2023-01-21

## 2023-01-20 RX ORDER — SODIUM CHLORIDE 9 MG/ML
3 INJECTION INTRAMUSCULAR; INTRAVENOUS; SUBCUTANEOUS EVERY 8 HOURS
Refills: 0 | Status: DISCONTINUED | OUTPATIENT
Start: 2023-01-20 | End: 2023-01-21

## 2023-01-20 RX ORDER — FERROUS SULFATE 325(65) MG
325 TABLET ORAL DAILY
Refills: 0 | Status: DISCONTINUED | OUTPATIENT
Start: 2023-01-20 | End: 2023-01-21

## 2023-01-20 RX ORDER — KETOROLAC TROMETHAMINE 30 MG/ML
30 SYRINGE (ML) INJECTION ONCE
Refills: 0 | Status: DISCONTINUED | OUTPATIENT
Start: 2023-01-20 | End: 2023-01-20

## 2023-01-20 RX ORDER — DEXTROSE 50 % IN WATER 50 %
12.5 SYRINGE (ML) INTRAVENOUS ONCE
Refills: 0 | Status: DISCONTINUED | OUTPATIENT
Start: 2023-01-20 | End: 2023-01-21

## 2023-01-20 RX ORDER — AER TRAVELER 0.5 G/1
1 SOLUTION RECTAL; TOPICAL EVERY 4 HOURS
Refills: 0 | Status: DISCONTINUED | OUTPATIENT
Start: 2023-01-20 | End: 2023-01-21

## 2023-01-20 RX ADMIN — SODIUM CHLORIDE 3 MILLILITER(S): 9 INJECTION INTRAMUSCULAR; INTRAVENOUS; SUBCUTANEOUS at 06:36

## 2023-01-20 RX ADMIN — Medication 1 TABLET(S): at 13:13

## 2023-01-20 RX ADMIN — Medication 1000 MILLIUNIT(S)/MIN: at 01:58

## 2023-01-20 RX ADMIN — Medication 975 MILLIGRAM(S): at 21:30

## 2023-01-20 RX ADMIN — SODIUM CHLORIDE 3 MILLILITER(S): 9 INJECTION INTRAMUSCULAR; INTRAVENOUS; SUBCUTANEOUS at 14:00

## 2023-01-20 RX ADMIN — Medication 30 MILLIGRAM(S): at 02:25

## 2023-01-20 RX ADMIN — Medication 975 MILLIGRAM(S): at 20:37

## 2023-01-20 RX ADMIN — Medication 1 SPRAY(S): at 06:06

## 2023-01-20 RX ADMIN — Medication 325 MILLIGRAM(S): at 13:13

## 2023-01-20 RX ADMIN — Medication 30 MILLIGRAM(S): at 02:12

## 2023-01-20 NOTE — CONSULT NOTE ADULT - PROBLEM SELECTOR RECOMMENDATION 9
with decent control during pregnancy on short acting insulin prn only  now with mild Postmeal hyperglycemia- likely due to diet noncompliance -due to eating " Feast " per pt  compliance d/w pt  no intervention for now  monitor fsg  d/w prim team

## 2023-01-20 NOTE — CONSULT NOTE ADULT - SUBJECTIVE AND OBJECTIVE BOX
Patient is a 32y old  Female with hx of GDM on short acting insulin as needed only now post partum with hyperglycemia.       PAST MEDICAL & SURGICAL HISTORY:         MEDICATIONS  (STANDING):  dextrose 5%. 1000 milliLiter(s) (50 mL/Hr) IV Continuous <Continuous>  dextrose 5%. 1000 milliLiter(s) (100 mL/Hr) IV Continuous <Continuous>  dextrose 50% Injectable 25 Gram(s) IV Push once  dextrose 50% Injectable 12.5 Gram(s) IV Push once  dextrose 50% Injectable 25 Gram(s) IV Push once  diphtheria/tetanus/pertussis (acellular) Vaccine (Adacel) 0.5 milliLiter(s) IntraMuscular once  ferrous    sulfate 325 milliGRAM(s) Oral daily  glucagon  Injectable 1 milliGRAM(s) IntraMuscular once  insulin lispro (ADMELOG) corrective regimen sliding scale   SubCutaneous three times a day before meals  oxytocin Infusion 41.667 milliUNIT(s)/Min (125 mL/Hr) IV Continuous <Continuous>  prenatal multivitamin 1 Tablet(s) Oral daily  sodium chloride 0.9% lock flush 3 milliLiter(s) IV Push every 8 hours    MEDICATIONS  (PRN):  acetaminophen     Tablet .. 975 milliGRAM(s) Oral every 6 hours PRN Mild Pain (1 - 3)  benzocaine 20%/menthol 0.5% Spray 1 Spray(s) Topical every 6 hours PRN for Perineal discomfort  dextrose Oral Gel 15 Gram(s) Oral once PRN Blood Glucose LESS THAN 70 milliGRAM(s)/deciliter  dibucaine 1% Ointment 1 Application(s) Topical every 6 hours PRN Perineal discomfort  diphenhydrAMINE 25 milliGRAM(s) Oral every 6 hours PRN Pruritus  hydrocortisone 1% Cream 1 Application(s) Topical every 6 hours PRN Moderate Pain (4-6)  ibuprofen  Tablet. 600 milliGRAM(s) Oral every 6 hours PRN Moderate Pain (4 - 6)  lanolin Ointment 1 Application(s) Topical every 6 hours PRN nipple soreness  magnesium hydroxide Suspension 30 milliLiter(s) Oral two times a day PRN Constipation  oxyCODONE    IR 5 milliGRAM(s) Oral every 3 hours PRN Severe Pain (7 - 10)  pramoxine 1%/zinc 5% Cream 1 Application(s) Topical every 4 hours PRN Moderate Pain (4-6)  simethicone 80 milliGRAM(s) Chew every 4 hours PRN Gas  witch hazel Pads 1 Application(s) Topical every 4 hours PRN Perineal discomfort      FAMILY HISTORY:      SOCIAL HISTORY:      REVIEW OF SYSTEMS:  CONSTITUTIONAL: No fever, weight loss, or fatigue  EYES: No eye pain, visual disturbances, or discharge  ENT:  No difficulty hearing, tinnitus, vertigo; No sinus or throat pain  NECK: No pain or stiffness  RESPIRATORY: No cough, wheezing, chills or hemoptysis; No Shortness of Breath  CARDIOVASCULAR: No chest pain, palpitations, passing out, dizziness, or leg swelling  GASTROINTESTINAL: No abdominal or epigastric pain. No nausea, vomiting, or hematemesis; No diarrhea or constipation. No melena or hematochezia.  GENITOURINARY: No dysuria, frequency, hematuria, or incontinence  NEUROLOGICAL: No headaches, memory loss, loss of strength, numbness, or tremors  SKIN: No itching, burning, rashes, or lesions   LYMPH Nodes: No enlarged glands  ENDOCRINE: No heat or cold intolerance; No hair loss  MUSCULOSKELETAL: No joint pain or swelling; No muscle, back, or extremity pain  PSYCHIATRIC: No depression, anxiety, mood swings, or difficulty sleeping  HEME/LYMPH: No easy bruising, or bleeding gums  ALLERGY AND IMMUNOLOGIC: No hives or eczema	        Vital Signs Last 24 Hrs  T(C): 36.7 (20 Jan 2023 13:01), Max: 37.1 (20 Jan 2023 02:10)  T(F): 98 (20 Jan 2023 13:01), Max: 98.8 (20 Jan 2023 02:10)  HR: 84 (20 Jan 2023 13:01) (68 - 105)  BP: 94/60 (20 Jan 2023 13:01) (94/60 - 122/79)  BP(mean): 95 (20 Jan 2023 02:55) (79 - 95)  RR: 17 (20 Jan 2023 13:01) (17 - 20)  SpO2: 99% (20 Jan 2023 13:01) (97% - 99%)    Parameters below as of 20 Jan 2023 13:01  Patient On (Oxygen Delivery Method): room air          Constitutional:    NC/AT: nad    Neck:  No JVD, bruits or thyromegaly    Respiratory:  Clear without rales or rhonchi    Cardiovascular:  RR without murmur, rub or gallop.    Gastrointestinal: Soft without hepatosplenomegaly.    Extremities: without cyanosis, clubbing or edema.    Neurological:  Oriented   x   3   . No gross sensory or motor defects.        LABS:                        13.1   10.48 )-----------( 206      ( 19 Jan 2023 10:49 )             37.2               PT/INR - ( 19 Jan 2023 10:49 )   PT: 11.1 sec;   INR: 0.93 ratio         PTT - ( 19 Jan 2023 10:49 )  PTT:27.4 sec    CAPILLARY BLOOD GLUCOSE      POCT Blood Glucose.: 163 mg/dL (20 Jan 2023 11:12)  POCT Blood Glucose.: 105 mg/dL (20 Jan 2023 06:11)  POCT Blood Glucose.: 80 mg/dL (19 Jan 2023 23:07)  POCT Blood Glucose.: 130 mg/dL (19 Jan 2023 19:08)  POCT Blood Glucose.: 133 mg/dL (19 Jan 2023 14:27)      RADIOLOGY & ADDITIONAL STUDIES:

## 2023-01-20 NOTE — LACTATION INITIAL EVALUATION - LACTATION INTERVENTIONS
Breastfeeding on cue 8-12X/24 hours with diaper count to assess for adequate intake, safe skin to skin and rooming-in encouraged./initiate/review safe skin-to-skin/initiate/review hand expression/initiate/review techniques for position and latch/reviewed components of an effective feeding and at least 8 effective feedings per day required/reviewed importance of monitoring infant diapers, the breastfeeding log, and minimum output each day/reviewed risks of unnecessary formula supplementation/reviewed benefits and recommendations for rooming in/reviewed feeding on demand/by cue at least 8 times a day

## 2023-01-20 NOTE — CONSULT NOTE ADULT - ASSESSMENT
Patient is a 32y old  Female with hx of GDM on short acting insulin as needed only now post partum with hyperglycemia.

## 2023-01-21 VITALS
SYSTOLIC BLOOD PRESSURE: 100 MMHG | OXYGEN SATURATION: 98 % | HEART RATE: 83 BPM | DIASTOLIC BLOOD PRESSURE: 65 MMHG | RESPIRATION RATE: 17 BRPM | TEMPERATURE: 98 F

## 2023-01-21 PROCEDURE — 59025 FETAL NON-STRESS TEST: CPT

## 2023-01-21 PROCEDURE — 85025 COMPLETE CBC W/AUTO DIFF WBC: CPT

## 2023-01-21 PROCEDURE — 86901 BLOOD TYPING SEROLOGIC RH(D): CPT

## 2023-01-21 PROCEDURE — 36415 COLL VENOUS BLD VENIPUNCTURE: CPT

## 2023-01-21 PROCEDURE — 85730 THROMBOPLASTIN TIME PARTIAL: CPT

## 2023-01-21 PROCEDURE — 59050 FETAL MONITOR W/REPORT: CPT

## 2023-01-21 PROCEDURE — 85610 PROTHROMBIN TIME: CPT

## 2023-01-21 PROCEDURE — 86900 BLOOD TYPING SEROLOGIC ABO: CPT

## 2023-01-21 PROCEDURE — 86850 RBC ANTIBODY SCREEN: CPT

## 2023-01-21 PROCEDURE — G0463: CPT

## 2023-01-21 PROCEDURE — 86780 TREPONEMA PALLIDUM: CPT

## 2023-01-21 PROCEDURE — 82962 GLUCOSE BLOOD TEST: CPT

## 2023-01-21 PROCEDURE — 85384 FIBRINOGEN ACTIVITY: CPT

## 2023-01-21 PROCEDURE — 87635 SARS-COV-2 COVID-19 AMP PRB: CPT

## 2023-01-21 RX ORDER — ACETAMINOPHEN 500 MG
1 TABLET ORAL
Qty: 40 | Refills: 0
Start: 2023-01-21 | End: 2023-01-30

## 2023-01-21 RX ORDER — IBUPROFEN 200 MG
1 TABLET ORAL
Qty: 40 | Refills: 0
Start: 2023-01-21 | End: 2023-01-30

## 2023-01-21 RX ADMIN — Medication 975 MILLIGRAM(S): at 05:50

## 2023-01-21 RX ADMIN — Medication 975 MILLIGRAM(S): at 04:55

## 2023-01-21 NOTE — DISCHARGE NOTE OB - HOSPITAL COURSE
31 y/o female admitted for mIOL GDMA2  s/p    normal course of labor and delivery and post partum care, pt stable   covid neg

## 2023-01-21 NOTE — PROGRESS NOTE ADULT - SUBJECTIVE AND OBJECTIVE BOX
Patient seen resting comfortably.  No current complaints.  Denies HA, CP, SOB, N/V/D, dizziness, palpitations, worsening abdominal pain, worsening vaginal bleeding, or any other concerns.  Ambulating and voiding without difficulty.  Passing flatus and tolerating regular diet.  Attempting breastfeeding.     Vital Signs Last 24 Hrs  T(C): 36.6 (2023 05:56), Max: 37 (2023 09:00)  T(F): 97.9 (2023 05:56), Max: 98.6 (2023 09:00)  HR: 83 (2023 05:56) (81 - 94)  BP: 100/65 (2023 05:56) (94/60 - 105/69)  RR: 17 (2023 05:56) (16 - 18)  SpO2: 98% (2023 05:56) (97% - 99%)    Parameters below as of 2023 05:56  Patient On (Oxygen Delivery Method): room air        Physical Exam:     Gen: A&Ox 3, NAD  Chest: CTABL  Cardiac: S1+S2+ RRR  Breast: Soft, nontender, nonengorged  Abdomen: Soft, nontender, Fundus firm below umbilicus, +BS  Gyn: Mild lochia, intact/repaired  Extremities: Nontender, DTRS 2+, no worsening edema                          11.8   17.33 )-----------( 214      ( 2023 18:20 )             34.4       A/P:  Patient is a 32 year old P2 s/p . Postpartum day one in stable condition    -Continue pain management  -Encourage OOB and ambulation  -Check CBC  -Fingersticks and glycemic control with endo coverage   -Continue current care  -Plan for discharge     Attending aware 
Interval Events:  pt in nad    Allergies    No Known Allergies    Intolerances      Endocrine/Metabolic Medications:  dextrose 50% Injectable 25 Gram(s) IV Push once  dextrose 50% Injectable 12.5 Gram(s) IV Push once  dextrose 50% Injectable 25 Gram(s) IV Push once  dextrose Oral Gel 15 Gram(s) Oral once PRN  glucagon  Injectable 1 milliGRAM(s) IntraMuscular once  insulin lispro (ADMELOG) corrective regimen sliding scale   SubCutaneous three times a day before meals      Vital Signs Last 24 Hrs  T(C): 36.6 (21 Jan 2023 05:56), Max: 37 (20 Jan 2023 09:00)  T(F): 97.9 (21 Jan 2023 05:56), Max: 98.6 (20 Jan 2023 09:00)  HR: 83 (21 Jan 2023 05:56) (81 - 94)  BP: 100/65 (21 Jan 2023 05:56) (94/60 - 105/69)  BP(mean): --  RR: 17 (21 Jan 2023 05:56) (16 - 18)  SpO2: 98% (21 Jan 2023 05:56) (97% - 99%)    Parameters below as of 21 Jan 2023 05:56  Patient On (Oxygen Delivery Method): room air          PHYSICAL EXAM  All physical exam findings normal, except those marked:  General:	Alert, active, cooperative, NAD, well hydrated  .		[] Abnormal:  Neck		Normal: supple, no cervical adenopathy, no palpable thyroid  .		[] Abnormal:  Cardiovascular	Normal: regular rate, normal S1, S2, no murmurs  .		[] Abnormal:  Respiratory	Normal: no chest wall deformity, normal respiratory pattern, CTA B/L  .		[] Abnormal:  Abdominal	Normal: soft, ND, NT, bowel sounds present, no masses, no organomegaly  .		[] Abnormal:  		Normal normal genitalia, testes descended, circumcised/uncircumcised  .		Zhao stage:			Breast zhao:  .		Menstrual history:  .		[] Abnormal:  Extremities	Normal: FROM x4  .		[] Abnormal:  Skin		Normal: intact and not indurated, no rash, no acanthosis nigricans  .		[] Abnormal:  Neurologic	Normal: grossly intact  .		[] Abnormal:    LABS                        11.8   17.33 )-----------( 214      ( 20 Jan 2023 18:20 )             34.4         CAPILLARY BLOOD GLUCOSE      POCT Blood Glucose.: 94 mg/dL (21 Jan 2023 07:09)  POCT Blood Glucose.: 96 mg/dL (20 Jan 2023 22:00)  POCT Blood Glucose.: 85 mg/dL (20 Jan 2023 15:52)  POCT Blood Glucose.: 163 mg/dL (20 Jan 2023 11:12)        Assesment/plan    Patient is a 32y old  Female with hx of GDM on short acting insulin as needed only now post partum with hyperglycemia.      Problem/Recommendation - 1:  ·  Problem: Gestational diabetes mellitus (GDM).   ·  Recommendation: with decent control during pregnancy on short acting insulin prn only  s/p Postmeal hyperglycemia- likely due to diet noncompliance -due to eating " Feast " per pt  compliance d/w pt  fsg normal last 24 hrs  no medical intervention   f/u in 6 wks with pcp  d/w prim team.

## 2023-01-21 NOTE — DISCHARGE NOTE OB - CARE PLAN
Principal Discharge DX:	 (normal spontaneous vaginal delivery)  Assessment and plan of treatment:	routine vaginal delivery care, no tub baths, douching or sex for 6weeks, ambulate daily   follow up in 5-6wks with own obgyn   1 Principal Discharge DX:	 (normal spontaneous vaginal delivery)  Assessment and plan of treatment:	routine vaginal delivery care, no tub baths, douching or sex for 6weeks, ambulate daily   follow up in 5-6wks with own obgyn  Secondary Diagnosis:	Gestational diabetes mellitus, class A2  Assessment and plan of treatment:	follow up in office in 6 weeks for repeat glucose test   endocrinology did not recommend any anti-glycemics upon discharge home

## 2023-01-21 NOTE — DISCHARGE NOTE OB - PATIENT PORTAL LINK FT
You can access the FollowMyHealth Patient Portal offered by [facility] by registering at the following website: [email or web address]. By joining [employer]’s FollowMyHealth portal, you will also be able to view your health information using other applications (apps) compatible with our system.

## 2023-01-21 NOTE — DISCHARGE NOTE OB - CARE PROVIDERS DIRECT ADDRESSES
[email or web address]

## 2023-01-21 NOTE — DISCHARGE NOTE OB - NS MD DC FALL RISK RISK
For information on Fall & Injury Prevention, visit: [email or web address] OR  [email or web address] OR  https://www.cdc.gov/steadi/patient.html

## 2023-01-21 NOTE — DISCHARGE NOTE OB - PLAN OF CARE
routine vaginal delivery care, no tub baths, douching or sex for 6weeks, ambulate daily   follow up in 5-6wks with own obgyn follow up in office in 6 weeks for repeat glucose test   endocrinology did not recommend any anti-glycemics upon discharge home

## 2023-01-21 NOTE — DISCHARGE NOTE OB - CARE PROVIDER_API CALL
Jas Frey  OBSTETRICS AND GYNECOLOGY  [address], [place] [address]  Phone: [phone]  Fax: [phone]  Follow Up Time: 1 month

## 2023-01-21 NOTE — DISCHARGE NOTE OB - MEDICATION SUMMARY - MEDICATIONS TO TAKE
I will START or STAY ON the medications listed below when I get home from the hospital:    acetaminophen 500 mg oral tablet  -- 1 tab(s) by mouth every 6 hours, As Needed -for mild pain   -- This product contains acetaminophen.  Do not use  with any other product containing acetaminophen to prevent possible liver damage.    -- Indication: For mild pain    ibuprofen 600 mg oral tablet  -- 1 tab(s) by mouth every 6 hours, As Needed -for moderate pain   -- Do not take this drug if you are pregnant.  It is very important that you take or use this exactly as directed.  Do not skip doses or discontinue unless directed by your doctor.  May cause drowsiness or dizziness.  Obtain medical advice before taking any non-prescription drugs as some may affect the action of this medication.  Take with food or milk.    -- Indication: For moderate pain

## 2023-01-21 NOTE — DISCHARGE NOTE OB - MATERIALS PROVIDED
[facility] Screening Program/  Immunization Record/Breastfeeding Log/Bottle Feeding Log/Shaken Baby Prevention Handout/Birth Certificate Instructions

## 2023-03-01 ENCOUNTER — APPOINTMENT (OUTPATIENT)
Dept: OBGYN | Facility: CLINIC | Age: 33
End: 2023-03-01
Payer: MEDICAID

## 2023-03-01 VITALS
WEIGHT: 108 LBS | SYSTOLIC BLOOD PRESSURE: 132 MMHG | HEART RATE: 92 BPM | OXYGEN SATURATION: 100 % | DIASTOLIC BLOOD PRESSURE: 92 MMHG | BODY MASS INDEX: 21.81 KG/M2

## 2023-03-01 PROCEDURE — 99213 OFFICE O/P EST LOW 20 MIN: CPT | Mod: TH

## 2023-03-01 NOTE — HISTORY OF PRESENT ILLNESS
[Complications:___] : complications include: [unfilled] [] : delivered by vaginal delivery [Breastfeeding] : currently nursing [BF with Difficulty] : nursing without difficulty [Clean/Dry/Intact] : clean, dry and intact [Healed] : healed [Back to Normal] : is back to normal in size [None] : no vaginal bleeding [Normal] : the vagina was normal [Cervix Sample Taken] : cervical sample not taken for a Pap smear [Not Done] : Examination of breasts not done [Doing Well] : is doing well [No Sign of Infection] : is showing no signs of infection [Excellent Pain Control] : has excellent pain control [FreeTextEntry8] : Patient presents s/p . Reports bonding well with baby.  + breastfeeding.  No heavy bleeding, no intercourse yet.  GDMA2 in pregnancy.  [FreeTextEntry9] : GDMA2 [de-identified] : S/p  [de-identified] : Precautions and restrictions reviewed.  RTO in July for pap.  2 hour GCT given for GDMA2.  Wants to use condoms for protection.

## 2023-03-08 ENCOUNTER — LABORATORY RESULT (OUTPATIENT)
Age: 33
End: 2023-03-08

## 2023-10-27 ENCOUNTER — APPOINTMENT (OUTPATIENT)
Dept: OBGYN | Facility: CLINIC | Age: 33
End: 2023-10-27

## 2024-03-14 NOTE — PATIENT PROFILE OB - FALL HARM RISK - PT AGE POPULATION HIDDEN
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 Katia Chung   DATE OF APPT: 3/20/24   NOTES (FOR ALL VISITS) STATUS DETAILS   OFFICE NOTE from referring provider Internal Dr Dori Tejada @ [facility] Neurology:  3/13/24   OFFICE NOTE from other specialist Internal/CE Dr Gerardo Good @ [place]:  2/22/24  10/6/23    Dr Daniela Shaikh @ [facility] Pulmonary:  12/10/23 mychart encounter  10/5/23   MEDICATION LIST Internal    IMAGING  (FOR ALL VISITS)     MRI (HEAD, NECK, SPINE) Internal [facility]:  MRI Lumbar Spine 3/22/23  MRI Brain 11/4/21        
Adult